# Patient Record
Sex: FEMALE | Race: BLACK OR AFRICAN AMERICAN | Employment: OTHER | URBAN - METROPOLITAN AREA
[De-identification: names, ages, dates, MRNs, and addresses within clinical notes are randomized per-mention and may not be internally consistent; named-entity substitution may affect disease eponyms.]

---

## 2017-09-05 ENCOUNTER — HOSPITAL ENCOUNTER (OUTPATIENT)
Dept: MRI IMAGING | Age: 67
Discharge: HOME | End: 2017-09-05
Attending: ORTHOPAEDIC SURGERY
Payer: MEDICARE

## 2017-09-05 DIAGNOSIS — M54.50 LOW BACK PAIN: ICD-10-CM

## 2017-09-05 LAB — CREAT UR-MCNC: 0.6 MG/DL (ref 0.6–1.3)

## 2017-09-05 PROCEDURE — 82565 ASSAY OF CREATININE: CPT

## 2017-09-05 PROCEDURE — 74011250636 HC RX REV CODE- 250/636: Performed by: ORTHOPAEDIC SURGERY

## 2017-09-05 PROCEDURE — A9585 GADOBUTROL INJECTION: HCPCS | Performed by: ORTHOPAEDIC SURGERY

## 2017-09-05 PROCEDURE — 72158 MRI LUMBAR SPINE W/O & W/DYE: CPT

## 2017-09-05 RX ADMIN — GADOBUTROL 7.5 ML: 604.72 INJECTION INTRAVENOUS at 10:27

## 2019-02-05 RX ORDER — INSULIN GLARGINE 100 [IU]/ML
8-10 INJECTION, SOLUTION SUBCUTANEOUS
COMMUNITY

## 2019-02-05 NOTE — PROGRESS NOTES
PT aware of NPO status. PT aware of need to hold anticoagulants per protocol. Pt denies any blood thinners. PT aware of potential for sedation administration and need for  at discharge. Sister will accompany  PT aware of arrival time pre procedure. Will arrive at 1000 for 1200 procedure. Has phone number if any questions come up.

## 2019-02-06 ENCOUNTER — HOSPITAL ENCOUNTER (OUTPATIENT)
Dept: INTERVENTIONAL RADIOLOGY/VASCULAR | Age: 69
Discharge: HOME | End: 2019-02-06
Attending: SURGERY | Admitting: SURGERY
Payer: MEDICARE

## 2019-02-06 VITALS
SYSTOLIC BLOOD PRESSURE: 165 MMHG | BODY MASS INDEX: 30.32 KG/M2 | OXYGEN SATURATION: 99 % | RESPIRATION RATE: 18 BRPM | HEIGHT: 65 IN | HEART RATE: 47 BPM | DIASTOLIC BLOOD PRESSURE: 64 MMHG | TEMPERATURE: 97.6 F | WEIGHT: 182 LBS

## 2019-02-06 DIAGNOSIS — C50.919 BREAST CANCER (HCC): ICD-10-CM

## 2019-02-06 LAB
ANION GAP SERPL CALC-SCNC: 4 MMOL/L (ref 3–18)
APTT PPP: 28.9 SEC (ref 23–36.4)
BASOPHILS # BLD: 0.1 K/UL (ref 0–0.1)
BASOPHILS NFR BLD: 1 % (ref 0–2)
BUN SERPL-MCNC: 15 MG/DL (ref 7–18)
BUN/CREAT SERPL: 21 (ref 12–20)
CALCIUM SERPL-MCNC: 8.8 MG/DL (ref 8.5–10.1)
CHLORIDE SERPL-SCNC: 106 MMOL/L (ref 100–108)
CO2 SERPL-SCNC: 31 MMOL/L (ref 21–32)
CREAT SERPL-MCNC: 0.73 MG/DL (ref 0.6–1.3)
DIFFERENTIAL METHOD BLD: ABNORMAL
EOSINOPHIL # BLD: 0.2 K/UL (ref 0–0.4)
EOSINOPHIL NFR BLD: 3 % (ref 0–5)
ERYTHROCYTE [DISTWIDTH] IN BLOOD BY AUTOMATED COUNT: 15.6 % (ref 11.6–14.5)
GLUCOSE SERPL-MCNC: 90 MG/DL (ref 74–99)
HCT VFR BLD AUTO: 38.1 % (ref 35–45)
HGB BLD-MCNC: 11.9 G/DL (ref 12–16)
INR PPP: 0.9 (ref 0.8–1.2)
LYMPHOCYTES # BLD: 2.5 K/UL (ref 0.9–3.6)
LYMPHOCYTES NFR BLD: 32 % (ref 21–52)
MCH RBC QN AUTO: 25.6 PG (ref 24–34)
MCHC RBC AUTO-ENTMCNC: 31.2 G/DL (ref 31–37)
MCV RBC AUTO: 81.9 FL (ref 74–97)
MONOCYTES # BLD: 0.6 K/UL (ref 0.05–1.2)
MONOCYTES NFR BLD: 7 % (ref 3–10)
NEUTS SEG # BLD: 4.6 K/UL (ref 1.8–8)
NEUTS SEG NFR BLD: 57 % (ref 40–73)
PLATELET # BLD AUTO: 203 K/UL (ref 135–420)
PMV BLD AUTO: 10.8 FL (ref 9.2–11.8)
POTASSIUM SERPL-SCNC: 4 MMOL/L (ref 3.5–5.5)
PROTHROMBIN TIME: 12.1 SEC (ref 11.5–15.2)
RBC # BLD AUTO: 4.65 M/UL (ref 4.2–5.3)
SODIUM SERPL-SCNC: 141 MMOL/L (ref 136–145)
WBC # BLD AUTO: 7.9 K/UL (ref 4.6–13.2)

## 2019-02-06 PROCEDURE — 85730 THROMBOPLASTIN TIME PARTIAL: CPT

## 2019-02-06 PROCEDURE — 74011250636 HC RX REV CODE- 250/636: Performed by: SURGERY

## 2019-02-06 PROCEDURE — 74011250636 HC RX REV CODE- 250/636

## 2019-02-06 PROCEDURE — 80048 BASIC METABOLIC PNL TOTAL CA: CPT

## 2019-02-06 PROCEDURE — 85610 PROTHROMBIN TIME: CPT

## 2019-02-06 PROCEDURE — 99152 MOD SED SAME PHYS/QHP 5/>YRS: CPT

## 2019-02-06 PROCEDURE — 99153 MOD SED SAME PHYS/QHP EA: CPT

## 2019-02-06 PROCEDURE — 74011000250 HC RX REV CODE- 250

## 2019-02-06 PROCEDURE — 36561 INSERT TUNNELED CV CATH: CPT

## 2019-02-06 PROCEDURE — 85025 COMPLETE CBC W/AUTO DIFF WBC: CPT

## 2019-02-06 PROCEDURE — 36415 COLL VENOUS BLD VENIPUNCTURE: CPT

## 2019-02-06 RX ORDER — FLUMAZENIL 0.1 MG/ML
0.2 INJECTION INTRAVENOUS
Status: DISCONTINUED | OUTPATIENT
Start: 2019-02-06 | End: 2019-02-06 | Stop reason: HOSPADM

## 2019-02-06 RX ORDER — LIDOCAINE HYDROCHLORIDE AND EPINEPHRINE 10; 10 MG/ML; UG/ML
1-30 INJECTION, SOLUTION INFILTRATION; PERINEURAL
Status: COMPLETED | OUTPATIENT
Start: 2019-02-06 | End: 2019-02-06

## 2019-02-06 RX ORDER — LIDOCAINE HYDROCHLORIDE AND EPINEPHRINE 10; 10 MG/ML; UG/ML
INJECTION, SOLUTION INFILTRATION; PERINEURAL
Status: COMPLETED
Start: 2019-02-06 | End: 2019-02-06

## 2019-02-06 RX ORDER — FLUMAZENIL 0.1 MG/ML
INJECTION INTRAVENOUS
Status: DISCONTINUED
Start: 2019-02-06 | End: 2019-02-06 | Stop reason: WASHOUT

## 2019-02-06 RX ORDER — SODIUM CHLORIDE 9 MG/ML
25 INJECTION, SOLUTION INTRAVENOUS CONTINUOUS
Status: DISCONTINUED | OUTPATIENT
Start: 2019-02-06 | End: 2019-02-06 | Stop reason: HOSPADM

## 2019-02-06 RX ORDER — HEPARIN SODIUM 200 [USP'U]/100ML
500 INJECTION, SOLUTION INTRAVENOUS
Status: COMPLETED | OUTPATIENT
Start: 2019-02-06 | End: 2019-02-06

## 2019-02-06 RX ORDER — HEPARIN SODIUM (PORCINE) LOCK FLUSH IV SOLN 100 UNIT/ML 100 UNIT/ML
SOLUTION INTRAVENOUS
Status: COMPLETED
Start: 2019-02-06 | End: 2019-02-06

## 2019-02-06 RX ORDER — LIDOCAINE HYDROCHLORIDE 10 MG/ML
1-30 INJECTION INFILTRATION; PERINEURAL
Status: DISCONTINUED | OUTPATIENT
Start: 2019-02-06 | End: 2019-02-06 | Stop reason: HOSPADM

## 2019-02-06 RX ORDER — MIDAZOLAM HYDROCHLORIDE 1 MG/ML
INJECTION, SOLUTION INTRAMUSCULAR; INTRAVENOUS
Status: COMPLETED
Start: 2019-02-06 | End: 2019-02-06

## 2019-02-06 RX ORDER — HEPARIN SODIUM 200 [USP'U]/100ML
INJECTION, SOLUTION INTRAVENOUS
Status: COMPLETED
Start: 2019-02-06 | End: 2019-02-06

## 2019-02-06 RX ORDER — LIDOCAINE HYDROCHLORIDE 10 MG/ML
INJECTION INFILTRATION; PERINEURAL
Status: COMPLETED
Start: 2019-02-06 | End: 2019-02-06

## 2019-02-06 RX ORDER — FENTANYL CITRATE 50 UG/ML
25-200 INJECTION, SOLUTION INTRAMUSCULAR; INTRAVENOUS
Status: DISCONTINUED | OUTPATIENT
Start: 2019-02-06 | End: 2019-02-06 | Stop reason: HOSPADM

## 2019-02-06 RX ORDER — CLINDAMYCIN PHOSPHATE 900 MG/50ML
900 INJECTION, SOLUTION INTRAVENOUS ONCE
Status: COMPLETED | OUTPATIENT
Start: 2019-02-06 | End: 2019-02-06

## 2019-02-06 RX ORDER — HYDROCODONE BITARTRATE AND ACETAMINOPHEN 5; 325 MG/1; MG/1
1 TABLET ORAL
Qty: 10 TAB | Refills: 0 | Status: SHIPPED | OUTPATIENT
Start: 2019-02-06

## 2019-02-06 RX ORDER — HEPARIN SODIUM (PORCINE) LOCK FLUSH IV SOLN 100 UNIT/ML 100 UNIT/ML
500 SOLUTION INTRAVENOUS
Status: COMPLETED | OUTPATIENT
Start: 2019-02-06 | End: 2019-02-06

## 2019-02-06 RX ORDER — MIDAZOLAM HYDROCHLORIDE 1 MG/ML
.5-4 INJECTION, SOLUTION INTRAMUSCULAR; INTRAVENOUS
Status: DISCONTINUED | OUTPATIENT
Start: 2019-02-06 | End: 2019-02-06 | Stop reason: HOSPADM

## 2019-02-06 RX ORDER — NALOXONE HYDROCHLORIDE 0.4 MG/ML
INJECTION, SOLUTION INTRAMUSCULAR; INTRAVENOUS; SUBCUTANEOUS
Status: DISCONTINUED
Start: 2019-02-06 | End: 2019-02-06 | Stop reason: WASHOUT

## 2019-02-06 RX ORDER — NALOXONE HYDROCHLORIDE 0.4 MG/ML
0.1 INJECTION, SOLUTION INTRAMUSCULAR; INTRAVENOUS; SUBCUTANEOUS AS NEEDED
Status: DISCONTINUED | OUTPATIENT
Start: 2019-02-06 | End: 2019-02-06 | Stop reason: HOSPADM

## 2019-02-06 RX ORDER — FENTANYL CITRATE 50 UG/ML
INJECTION, SOLUTION INTRAMUSCULAR; INTRAVENOUS
Status: COMPLETED
Start: 2019-02-06 | End: 2019-02-06

## 2019-02-06 RX ADMIN — LIDOCAINE HYDROCHLORIDE AND EPINEPHRINE 15 MG: 10; 10 INJECTION, SOLUTION INFILTRATION; PERINEURAL at 12:45

## 2019-02-06 RX ADMIN — CLINDAMYCIN PHOSPHATE 900 MG: 900 INJECTION, SOLUTION INTRAVENOUS at 12:38

## 2019-02-06 RX ADMIN — LIDOCAINE HYDROCHLORIDE 16 ML: 10 INJECTION, SOLUTION INFILTRATION; PERINEURAL at 12:42

## 2019-02-06 RX ADMIN — HEPARIN SODIUM 1000 UNITS: 200 INJECTION, SOLUTION INTRAVENOUS at 12:40

## 2019-02-06 RX ADMIN — SODIUM CHLORIDE 25 ML/HR: 900 INJECTION, SOLUTION INTRAVENOUS at 10:44

## 2019-02-06 RX ADMIN — HEPARIN SODIUM (PORCINE) LOCK FLUSH IV SOLN 100 UNIT/ML 500 UNITS: 100 SOLUTION at 13:01

## 2019-02-06 RX ADMIN — FENTANYL CITRATE 50 MCG: 50 INJECTION, SOLUTION INTRAMUSCULAR; INTRAVENOUS at 12:42

## 2019-02-06 RX ADMIN — MIDAZOLAM HYDROCHLORIDE 0.5 MG: 1 INJECTION, SOLUTION INTRAMUSCULAR; INTRAVENOUS at 12:58

## 2019-02-06 RX ADMIN — MIDAZOLAM HYDROCHLORIDE 1 MG: 1 INJECTION, SOLUTION INTRAMUSCULAR; INTRAVENOUS at 12:49

## 2019-02-06 RX ADMIN — FENTANYL CITRATE 50 MCG: 50 INJECTION, SOLUTION INTRAMUSCULAR; INTRAVENOUS at 12:49

## 2019-02-06 RX ADMIN — FENTANYL CITRATE 25 MCG: 50 INJECTION, SOLUTION INTRAMUSCULAR; INTRAVENOUS at 12:58

## 2019-02-06 RX ADMIN — MIDAZOLAM HYDROCHLORIDE 1 MG: 1 INJECTION, SOLUTION INTRAMUSCULAR; INTRAVENOUS at 12:42

## 2019-02-06 NOTE — H&P
VASCULAR H & P    Amy Mariano is a 76 y.o. female who is admitted for evaluation and possible treatment of right breast ca      Admission diagnosis: right breast ca        HPI:  77 yo female with right breast ca presents for [facility]. HISTORY:  Problem List:   Patient Active Problem List   Diagnosis Code    Spondylolisthesis of lumbar region M43.16    DDD (degenerative disc disease), lumbar M51.36    HTN (hypertension) I10    DM (diabetes mellitus) ([facility] [address] E11.9     Past Medical History:   Diagnosis Date    Arthritis     osteo    Asthma     Cancer ([facility] [address]     right breast    Chronic pain     low back and right leg    Diabetes ([facility] [address] 2008    Edema extremities     right leg    GERD (gastroesophageal reflux disease)     Hypercholesterolemia     Hypertension 1998    Neuropathy     right arm    Psychiatric disorder     anxiety     Past Surgical History:   Procedure Laterality Date    BREAST SURGERY PROCEDURE UNLISTED Right 2008    with lymph node dissection    HX CATARACT REMOVAL Bilateral     HX LAP CHOLECYSTECTOMY      HX LUMBAR FUSION  2014    HX ORTHOPAEDIC      HX LUIS AND BSO  1971    HX TONSILLECTOMY       History reviewed. No pertinent family history. Home Medications:      Allergies   Allergen Reactions    Latex Itching and Swelling    Pcn [Penicillins] Rash    Tape [Adhesive] Rash         Current Facility-Administered Medications:     0.9% sodium chloride infusion, 25 mL/hr, IntraVENous, CONTINUOUS, Yudelka Campuzano MD, Last Rate: 25 mL/hr at 02/06/19 1044, 25 mL/hr at 02/06/19 1044    fentaNYL citrate (PF) injection  mcg,  mcg, IntraVENous, Tatianna Waterman Todd W, MD    flumazenil (ROMAZICON) 0.1 mg/mL injection 0.2 mg, 0.2 mg, IntraVENous, Tatianna Waterman Balinda Gladden, MD    midazolam (PF) (VERSED) injection 0.5-4 mg, 0.5-4 mg, IntraVENous, Tatianna Waterman Todd W, MD    naloxone [facility]) injection 0.1 mg, 0.1 mg, IntraVENous, PRN, Tatianna Oseas Atkinson MD    clindamycin (CLEOCIN) 900mg NS 50mL IVPB (premix), 900 mg, IntraVENous, ONCE, Oseas Campuzano MD    lidocaine-EPINEPHrine (XYLOCAINE) 1 %-1:100,000 injection  mg, 1-30 mL, SubCUTAneous, RAD ONCE, Oseas Campuzano MD    lidocaine (XYLOCAINE) 10 mg/mL (1 %) injection 1-30 mL, 1-30 mL, IntraDERMal, RAD ONCE, Oseas Campuzano MD    heparin (porcine) 100 unit/mL injection 500 Units, 500 Units, InterCATHeter, RAD ONCE, Oseas Campuzano MD    heparinized saline 2 units/mL infusion 1,000 Units, 500 mL, Irrigation, RAD ONCE, Marjorie Angelo MD    heparin (porcine) 100 unit/mL injection, , , ,     lidocaine-EPINEPHrine (XYLOCAINE) 1 %-1:100,000 injection, , , ,     heparinized saline 2 units/mL 1,000 unit/500 mL infusion, , , ,     lidocaine (XYLOCAINE) 10 mg/mL (1 %) injection, , , ,     flumazenil (ROMAZICON) 0.1 mg/mL injection, , , ,     naloxone (NARCAN) 0.4 mg/mL injection, , , ,     fentaNYL citrate (PF) 50 mcg/mL injection, , , ,     midazolam (PF) (VERSED) 1 mg/mL injection, , , ,     Review of Systems:     Respiratory ROS: no cough, shortness of breath, or wheezing  Cardiovascular ROS: no chest pain or dyspnea on exertion  Gastrointestinal ROS: no abdominal pain, change in bowel habits, or black or bloody stools  Neurological ROS: no TIA or stroke symptoms    Physical Assessment:     General appearance: no distress  Eyes: sclera anicteric  Respiratory: clear to auscultation bilaterally  Cardiovascular: regular heart rate, no murmurs, no JVD  Abdomen: soft, non-tender, liver size normal to percussion/palpation. Spleen not palpable.  No obvious ascites  Extremities: no muscle wasting, no gross arthritic changes  Neurologic: alert and oriented, cranial nerves grossly intact, no asterixis          Lab     Recent Results (from the past 120 hour(s))   CBC WITH AUTOMATED DIFF    Collection Time: 02/06/19 10:33 AM   Result Value Ref Range    WBC 7.9 4.6 - 13.2 K/uL    RBC 4.65 4.20 - 5.30 M/uL    HGB 11.9 (L) 12.0 - 16.0 g/dL    HCT 38.1 35.0 - 45.0 %    MCV 81.9 74.0 - 97.0 FL    MCH 25.6 24.0 - 34.0 PG    MCHC 31.2 31.0 - 37.0 g/dL    RDW 15.6 (H) 11.6 - 14.5 %    PLATELET 524 084 - 546 K/uL    MPV 10.8 9.2 - 11.8 FL    NEUTROPHILS 57 40 - 73 %    LYMPHOCYTES 32 21 - 52 %    MONOCYTES 7 3 - 10 %    EOSINOPHILS 3 0 - 5 %    BASOPHILS 1 0 - 2 %    ABS. NEUTROPHILS 4.6 1.8 - 8.0 K/UL    ABS. LYMPHOCYTES 2.5 0.9 - 3.6 K/UL    ABS. MONOCYTES 0.6 0.05 - 1.2 K/UL    ABS. EOSINOPHILS 0.2 0.0 - 0.4 K/UL    ABS.  BASOPHILS 0.1 0.0 - 0.1 K/UL    DF AUTOMATED           Xray     Impression:   1.  right breast ca  Plan:   mediport--likely left IJV access      Yolanda Santoro MD  2/6/2019

## 2019-02-06 NOTE — PROGRESS NOTES
Pt is all prepped and ready for procedure. 1330 pt back to care unit s/p port placement, dressing to rt neck dry and intact  1430 pt discharged home in the care of sister in stable condition with no complaints.

## 2019-02-06 NOTE — BRIEF OP NOTE
BRIEF OPERATIVE NOTE    Date of Procedure: 2/6/2019   Preoperative Diagnosis: Right breast ca  Postoperative Diagnosis: same   Procedure:  L IJV cindy  Surgeon:  Khris Chiu    Anesthesia: moderate sedation  Estimated Blood Loss: minimal  Specimens: * No specimens in log *   Findings: none   Complications: none  Implants: Bard SL 8F power port

## 2019-02-06 NOTE — PROGRESS NOTES
TRANSFER - IN REPORT:    Verbal report received from Zora Schaefer  being received from [facility](unit) for ordered procedure      Report consisted of patients Situation, Background, Assessment and   Recommendations(SBAR). Information from the following report(s) SBAR, Kardex and MAR was reviewed with the receiving nurse. Opportunity for questions and clarification was provided. Assessment completed upon patients arrival to unit and care assumed.

## 2019-02-06 NOTE — DISCHARGE INSTRUCTIONS
Patient Education        Implanted [facility] [facility] for Chemotherapy: Care Instructions  Your Care Instructions  An implanted port is a device placed, in most cases, under the skin of your chest below your collarbone. It is made of plastic, stainless steel, or titanium. The port is about the size of a quarter, but thicker. A thin, flexible tube called a catheter runs from the port into a large vein. A membrane (septum) similar to a pencil eraser is in the center of the port. A nurse uses a needle to put chemotherapy or other medicine and fluids through the septum into a blood vessel. A health professional also can take blood for tests through the port. An implanted port can be used for months. A special needle (called a Atwood needle) may stay in the port for a short time. The port and catheter need regular care to make sure they do not get blocked. Tell your doctor if you take aspirin or some other blood thinner. These medicines can increase the chance of bleeding inside your body. Follow-up care is a key part of your treatment and safety. Be sure to make and go to all appointments, and call your doctor if you are having problems. It's also a good idea to know your test results and keep a list of the medicines you take. How can you care for yourself at home? · You will probably need to take 1 day off from work and will be able return to normal activities shortly after. This depends on the type of work you do, why you have the port, and how you feel. · You probably will be able to bathe and swim. But you may need to avoid some activities if a Atwood needle is left in the port. Talk to your doctor about any limits on your activity. · Some clothes may rub the skin over the port. Do not wear a bra or suspenders that irritate your skin near the port. Do not have your blood pressure taken on the arm with the port. · You will get a medical alert card with information about your port. Carry this with you.  It will tell health care workers you have a port in case you need emergency care. · Your port will need regular flushing to keep it open. A nurse or other health professional will do this for you. When should you call for help? Call your doctor now or seek immediate medical care if:    · You have signs of infection, such as:  ? Increased pain, swelling, warmth, or redness near the port. ? Red streaks leading from the port. ? Pus draining from the port. ? A fever.     · You have pain or swelling in your neck or arm.     · You have trouble breathing.    Watch closely for changes in your health, and be sure to contact your doctor if:    · You have any problems with your port. Where can you learn more? Go to http://moi-manish.info/. Enter [phone] in the search box to learn more about \"Implanted [employer] [facility] [place] for Chemotherapy: Care Instructions. \"  Current as of: September 23, 2018  Content Version: 11.9  © 3897-0658 [employer]. Care instructions adapted under license by [employer] (which disclaims liability or warranty for this information). If you have questions about a medical condition or this instruction, always ask your healthcare professional. [place] [address] any warranty or liability for your use of this information. DISCHARGE SUMMARY from Nurse    PATIENT INSTRUCTIONS:    After general anesthesia or intravenous sedation, for 24 hours or while taking prescription Narcotics:  · Limit your activities  · Do not drive and operate hazardous machinery  · Do not make important personal or business decisions  · Do  not drink alcoholic beverages  · If you have not urinated within 8 hours after discharge, please contact your surgeon on call.     Report the following to your surgeon:  · Excessive pain, swelling, redness or odor of or around the surgical area  · Temperature over 100.5  · Nausea and vomiting lasting longer than 4 hours or if unable to take medications  · Any signs of decreased circulation or nerve impairment to extremity: change in color, persistent  numbness, tingling, coldness or increase pain  · Any questions    What to do at Home:  Recommended activity: Activity as tolerated,       *  Please give a list of your current medications to your Primary Care Provider. *  Please update this list whenever your medications are discontinued, doses are      changed, or new medications (including over-the-counter products) are added. *  Please carry medication information at all times in case of emergency situations. These are general instructions for a healthy lifestyle:    No smoking/ No tobacco products/ Avoid exposure to second hand smoke  Surgeon General's Warning:  Quitting smoking now greatly reduces serious risk to your health. Obesity, smoking, and sedentary lifestyle greatly increases your risk for illness    A healthy diet, regular physical exercise & weight monitoring are important for maintaining a healthy lifestyle    You may be retaining fluid if you have a history of heart failure or if you experience any of the following symptoms:  Weight gain of 3 pounds or more overnight or 5 pounds in a week, increased swelling in our hands or feet or shortness of breath while lying flat in bed. Please call your doctor as soon as you notice any of these symptoms; do not wait until your next office visit. Recognize signs and symptoms of STROKE:    F-face looks uneven    A-arms unable to move or move unevenly    S-speech slurred or non-existent    T-time-call 911 as soon as signs and symptoms begin-DO NOT go       Back to bed or wait to see if you get better-TIME IS BRAIN. Warning Signs of HEART ATTACK     Call 911 if you have these symptoms:   Chest discomfort. Most heart attacks involve discomfort in the center of the chest that lasts more than a few minutes, or that goes away and comes back.  It can feel like uncomfortable pressure, squeezing, fullness, or pain.  Discomfort in other areas of the upper body. Symptoms can include pain or discomfort in one or both arms, the back, neck, jaw, or stomach.  Shortness of breath with or without chest discomfort.  Other signs may include breaking out in a cold sweat, nausea, or lightheadedness. Don't wait more than five minutes to call 911 - MINUTES MATTER! Fast action can save your life. Calling 911 is almost always the fastest way to get lifesaving treatment. Emergency Medical Services staff can begin treatment when they arrive -- up to an hour sooner than if someone gets to the hospital by car. The discharge information has been reviewed with the patient and caregiver. The patient and caregiver verbalized understanding. Discharge medications reviewed with the patient and caregiver and appropriate educational materials and side effects teaching were provided.     Patient armband removed and shredded    ___________________________________________________________________________________________________________________________________

## 2019-02-07 NOTE — OP NOTES
[facility]  OPERATIVE REPORT    Name:  Hazel Marquez  MR#:   [id number]  :  1950  ACCOUNT #:  [de-identified]  DATE OF SERVICE:  2019    PREOPERATIVE DIAGNOSIS:  Right breast cancer. POSTOPERATIVE DIAGNOSIS:  Right breast cancer. PROCEDURE PERFORMED:  Left internal jugular vein  single-lumen MediPort insertion with ultrasound and  fluoroscopy guidance. SURGEON:  Pantera Watts MD    ANESTHESIA:  Monitored sedation, administered by Roland Decker RN. The patient received a total of 2.5 mg of  Versed and 125 mcg of fentanyl, start time 1242 hours and  completion 1314 hours. COMPLICATIONS:  None. ESTIMATED BLOOD LOSS:  Minimal.    INDICATIONS FOR PROCEDURE:  The patient is a 12-year-old  female with right breast cancer who presents for MediPort. OPERATIVE FINDINGS:  The left internal jugular vein was used  as the cannulation site with the tip of the catheter resting  in the right atrium. DESCRIPTION OF PROCEDURE:  Having obtained prior consent,  the patient was brought to the angiogram suite and was  placed in the supine position. She was prepped and draped  in sterile fashion. Appropriate time-out with Guys  protocol was performed. 1% lidocaine without epinephrine  was used to anesthetize the skin and subcutaneous tissue  overlying the left internal jugular vein. This was  documented to be patent with ultrasound. At the end of the  real-time ultrasound, it was cannulated with an 18-gauge  needle. A 0.035 J-wire was passed. Fluoroscopy was  confirmed in an appropriate fashion this J-wire into the  right atrium. The subcutaneous tract was then anesthetized  between the neck insertion site in the left anterior chest  wall. A transverse incision was made in the left anterior  chest wall and the area where the patient had a prior  MediPort. Subcutaneous pocket was fashioned.   3-0 nylon  suture x3 were placed in the underlying pectoralis fascia to  anchor the port.  The catheter was then tunneled from the  insertion site to the counterincision. Dilator and sheath  were then passed over the guidewire under fluoroscopic  guidance. The dilator and the guidewire were removed. The  catheter was passed over the guidewire into position under  fluoroscopic guidance. Then, subsequently, the catheter was  passed through the sheath and into position. The sheath was  peeled away. The catheter course was checked and there was  no kinking with the tip resting in the right atrium. The  catheter was trimmed at 30 cm and attached to the port,  which was then affixed to the underlying pectoralis fascia  with a previously placed nylon sutures. Excellent  hemostasis was obtained. The subcutaneous tissue was closed  by reapproximating it in running continuous fashion with 3-0  nylon suture and then, the skin was closed in a subcuticular  fashion. Monocryl suture and Dermabond were applied to the  pocket site incision as well as Dermabond was applied to the  neck insertion site after closing it in a subcuticular  fashion as well. The patient tolerated the procedure well.         Augustin Rojas MD      [id number]  D:  02/06/2019 13:35  T:  02/07/2019 3:22  JOB #:  [id number]